# Patient Record
Sex: MALE | Race: WHITE | Employment: STUDENT | URBAN - METROPOLITAN AREA
[De-identification: names, ages, dates, MRNs, and addresses within clinical notes are randomized per-mention and may not be internally consistent; named-entity substitution may affect disease eponyms.]

---
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
## 2024-07-19 ENCOUNTER — APPOINTMENT (OUTPATIENT)
Dept: GENERAL RADIOLOGY | Facility: HOSPITAL | Age: 19
End: 2024-07-19
Attending: EMERGENCY MEDICINE
Payer: COMMERCIAL

## 2024-07-19 ENCOUNTER — HOSPITAL ENCOUNTER (EMERGENCY)
Facility: HOSPITAL | Age: 19
Discharge: HOME | End: 2024-07-19
Attending: EMERGENCY MEDICINE
Payer: COMMERCIAL

## 2024-07-19 VITALS
OXYGEN SATURATION: 98 % | HEIGHT: 71 IN | HEART RATE: 59 BPM | SYSTOLIC BLOOD PRESSURE: 106 MMHG | BODY MASS INDEX: 21 KG/M2 | DIASTOLIC BLOOD PRESSURE: 59 MMHG | RESPIRATION RATE: 16 BRPM | WEIGHT: 150 LBS

## 2024-07-19 DIAGNOSIS — S61.411A LACERATION OF RIGHT HAND WITHOUT FOREIGN BODY, INITIAL ENCOUNTER: Primary | ICD-10-CM

## 2024-07-19 DIAGNOSIS — F16.90: ICD-10-CM

## 2024-07-19 DIAGNOSIS — S43.005A DISLOCATION OF LEFT SHOULDER JOINT, INITIAL ENCOUNTER: ICD-10-CM

## 2024-07-19 LAB
ALBUMIN SERPL-MCNC: 5.1 G/DL (ref 3.2–4.8)
ALBUMIN/GLOB SERPL: 2.4 {RATIO} (ref 1–2)
ALP LIVER SERPL-CCNC: 71 U/L
ALT SERPL-CCNC: 22 U/L
ANION GAP SERPL CALC-SCNC: 18 MMOL/L (ref 0–18)
APAP SERPL-MCNC: <2 UG/ML (ref 10–20)
AST SERPL-CCNC: 37 U/L (ref ?–34)
ATRIAL RATE: 144 BPM
BASOPHILS # BLD AUTO: 0.06 X10(3) UL (ref 0–0.2)
BASOPHILS NFR BLD AUTO: 0.3 %
BILIRUB SERPL-MCNC: 0.5 MG/DL (ref 0.3–1.2)
BUN BLD-MCNC: 17 MG/DL (ref 9–23)
CALCIUM BLD-MCNC: 10.1 MG/DL (ref 8.7–10.4)
CHLORIDE SERPL-SCNC: 104 MMOL/L (ref 98–112)
CO2 SERPL-SCNC: 16 MMOL/L (ref 21–32)
CREAT BLD-MCNC: 1.47 MG/DL
EGFRCR SERPLBLD CKD-EPI 2021: 70 ML/MIN/1.73M2 (ref 60–?)
EOSINOPHIL # BLD AUTO: 0.04 X10(3) UL (ref 0–0.7)
EOSINOPHIL NFR BLD AUTO: 0.2 %
ERYTHROCYTE [DISTWIDTH] IN BLOOD BY AUTOMATED COUNT: 12.5 %
ETHANOL SERPL-MCNC: <3 MG/DL (ref ?–3)
GLOBULIN PLAS-MCNC: 2.1 G/DL (ref 2.8–4.4)
GLUCOSE BLD-MCNC: 349 MG/DL (ref 70–99)
HCT VFR BLD AUTO: 45.7 %
HGB BLD-MCNC: 15.6 G/DL
IMM GRANULOCYTES # BLD AUTO: 0.13 X10(3) UL (ref 0–1)
IMM GRANULOCYTES NFR BLD: 0.7 %
LYMPHOCYTES # BLD AUTO: 1.69 X10(3) UL (ref 1.5–5)
LYMPHOCYTES NFR BLD AUTO: 8.5 %
MCH RBC QN AUTO: 29.7 PG (ref 26–34)
MCHC RBC AUTO-ENTMCNC: 34.1 G/DL (ref 31–37)
MCV RBC AUTO: 86.9 FL
MONOCYTES # BLD AUTO: 0.97 X10(3) UL (ref 0.1–1)
MONOCYTES NFR BLD AUTO: 4.9 %
NEUTROPHILS # BLD AUTO: 17.1 X10 (3) UL (ref 1.5–7.7)
NEUTROPHILS # BLD AUTO: 17.1 X10(3) UL (ref 1.5–7.7)
NEUTROPHILS NFR BLD AUTO: 85.4 %
OSMOLALITY SERPL CALC.SUM OF ELEC: 301 MOSM/KG (ref 275–295)
P AXIS: 79 DEGREES
P-R INTERVAL: 142 MS
PLATELET # BLD AUTO: 285 10(3)UL (ref 150–450)
POTASSIUM SERPL-SCNC: 3.3 MMOL/L (ref 3.5–5.1)
PROT SERPL-MCNC: 7.2 G/DL (ref 5.7–8.2)
Q-T INTERVAL: 244 MS
QRS DURATION: 82 MS
QTC CALCULATION (BEZET): 377 MS
R AXIS: 87 DEGREES
RBC # BLD AUTO: 5.26 X10(6)UL
SALICYLATES SERPL-MCNC: <3 MG/DL (ref 2.8–20)
SARS-COV-2 RNA RESP QL NAA+PROBE: NOT DETECTED
SODIUM SERPL-SCNC: 138 MMOL/L (ref 136–145)
T AXIS: 18 DEGREES
VENTRICULAR RATE: 144 BPM
WBC # BLD AUTO: 20 X10(3) UL (ref 4–11)

## 2024-07-19 PROCEDURE — 96372 THER/PROPH/DIAG INJ SC/IM: CPT

## 2024-07-19 PROCEDURE — 23650 CLTX SHO DSLC W/MNPJ WO ANES: CPT

## 2024-07-19 PROCEDURE — 73130 X-RAY EXAM OF HAND: CPT | Performed by: EMERGENCY MEDICINE

## 2024-07-19 PROCEDURE — 85025 COMPLETE CBC W/AUTO DIFF WBC: CPT | Performed by: EMERGENCY MEDICINE

## 2024-07-19 PROCEDURE — 73020 X-RAY EXAM OF SHOULDER: CPT | Performed by: EMERGENCY MEDICINE

## 2024-07-19 PROCEDURE — 93005 ELECTROCARDIOGRAM TRACING: CPT

## 2024-07-19 PROCEDURE — 73030 X-RAY EXAM OF SHOULDER: CPT | Performed by: EMERGENCY MEDICINE

## 2024-07-19 PROCEDURE — 93010 ELECTROCARDIOGRAM REPORT: CPT

## 2024-07-19 PROCEDURE — 80053 COMPREHEN METABOLIC PANEL: CPT | Performed by: EMERGENCY MEDICINE

## 2024-07-19 PROCEDURE — 80179 DRUG ASSAY SALICYLATE: CPT | Performed by: EMERGENCY MEDICINE

## 2024-07-19 PROCEDURE — 82077 ASSAY SPEC XCP UR&BREATH IA: CPT | Performed by: EMERGENCY MEDICINE

## 2024-07-19 PROCEDURE — 80143 DRUG ASSAY ACETAMINOPHEN: CPT | Performed by: EMERGENCY MEDICINE

## 2024-07-19 PROCEDURE — 96360 HYDRATION IV INFUSION INIT: CPT

## 2024-07-19 PROCEDURE — 99285 EMERGENCY DEPT VISIT HI MDM: CPT

## 2024-07-19 RX ORDER — MIRTAZAPINE 15 MG/1
15 TABLET, FILM COATED ORAL NIGHTLY
COMMUNITY

## 2024-07-19 RX ORDER — POTASSIUM CHLORIDE 20 MEQ/1
40 TABLET, EXTENDED RELEASE ORAL ONCE
Status: COMPLETED | OUTPATIENT
Start: 2024-07-19 | End: 2024-07-19

## 2024-07-19 RX ORDER — BUPIVACAINE HYDROCHLORIDE 2.5 MG/ML
INJECTION, SOLUTION EPIDURAL; INFILTRATION; INTRACAUDAL
Status: COMPLETED
Start: 2024-07-19 | End: 2024-07-19

## 2024-07-19 RX ORDER — CYPROHEPTADINE HYDROCHLORIDE 4 MG/1
4 TABLET ORAL 3 TIMES DAILY PRN
COMMUNITY

## 2024-07-19 NOTE — ED QUICK NOTES
Call placed to patient's mother to request updated medication info as med list provided by EMS does not contain dosages. No answer, voicemail left with request for return call.

## 2024-07-19 NOTE — ED QUICK NOTES
Patient now calm and cooperative. Still with intermittent confusion and forgetfulness, requiring frequent reorientation and redirection but responds appropriately to verbal reinforcement. Patient verbalizes remorse, states he is embarrassed with his behavior and is apologetic. Verbalizes understanding of plan of care and denies needs at this time.

## 2024-07-19 NOTE — ED NOTES
Spoke with Aruna, pt's mother and re-informed her that pt's has been discharged and needs a ride home. Mother stated she will be here in 15 minutes. Pt will be walked to the lobby to wait for ride.

## 2024-07-19 NOTE — ED QUICK NOTES
Patient admits to taking LSD tonight. Lacerations present to back and right hand. Right hand wrapped with pressure dressing.
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
## 2024-07-19 NOTE — DISCHARGE INSTRUCTIONS
Return to ER if fever, discharge, redness or other problems    Keep wound Clean and dry for 48 hours    Polysporin to wound    Suture removal 14 days

## 2024-07-29 NOTE — PROGRESS NOTES
Dx: s/p ORIF fibular fx       Insurance (Authorized # of Visits):  6           Authorizing Physician: Dr. Tania Marin  Next MD visit: 11/13  Fall Risk: standard         Precautions: NWB until physician follow up           Subjective: Patient reports that he h [facility].    Continue medications as directed.    Please discuss with Dr. Renteria other possible treatments such as Chelsea valve treatment vs Ohtuvayre.    Follow up in 3 months or sooner if problems or symptoms worsen prior.

## 2024-07-31 ENCOUNTER — HOSPITAL ENCOUNTER (EMERGENCY)
Facility: HOSPITAL | Age: 19
Discharge: HOME | End: 2024-07-31
Attending: EMERGENCY MEDICINE
Payer: COMMERCIAL

## 2024-07-31 VITALS
OXYGEN SATURATION: 95 % | RESPIRATION RATE: 20 BRPM | SYSTOLIC BLOOD PRESSURE: 112 MMHG | BODY MASS INDEX: 18 KG/M2 | TEMPERATURE: 98 F | DIASTOLIC BLOOD PRESSURE: 72 MMHG | HEART RATE: 71 BPM | WEIGHT: 130 LBS

## 2024-07-31 DIAGNOSIS — Z48.02 ENCOUNTER FOR REMOVAL OF SUTURES: Primary | ICD-10-CM
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
## 2025-03-26 ENCOUNTER — APPOINTMENT (OUTPATIENT)
Dept: GENERAL RADIOLOGY | Facility: HOSPITAL | Age: 20
End: 2025-03-26
Attending: EMERGENCY MEDICINE
Payer: COMMERCIAL

## 2025-03-26 ENCOUNTER — HOSPITAL ENCOUNTER (EMERGENCY)
Facility: HOSPITAL | Age: 20
Discharge: HOME | End: 2025-03-26
Attending: EMERGENCY MEDICINE
Payer: COMMERCIAL

## 2025-03-26 VITALS
RESPIRATION RATE: 18 BRPM | HEART RATE: 91 BPM | DIASTOLIC BLOOD PRESSURE: 83 MMHG | OXYGEN SATURATION: 100 % | BODY MASS INDEX: 18 KG/M2 | WEIGHT: 130 LBS | SYSTOLIC BLOOD PRESSURE: 122 MMHG | TEMPERATURE: 99 F

## 2025-03-26 DIAGNOSIS — S43.005A DISLOCATION OF LEFT SHOULDER JOINT, INITIAL ENCOUNTER: Primary | ICD-10-CM

## 2025-03-26 PROCEDURE — 23650 CLTX SHO DSLC W/MNPJ WO ANES: CPT

## 2025-03-26 PROCEDURE — 99284 EMERGENCY DEPT VISIT MOD MDM: CPT

## 2025-03-26 PROCEDURE — 73030 X-RAY EXAM OF SHOULDER: CPT | Performed by: EMERGENCY MEDICINE

## 2025-03-27 NOTE — DISCHARGE INSTRUCTIONS
Tylenol and/or ibuprofen for discomfort.  Shoulder immobilizer for comfort.  Recommend following up with orthopedic surgery for evaluation of recurrent shoulder dislocations.  Follow-up with PMD as needed.  Return if increased concerns.

## 2025-03-27 NOTE — ED PROVIDER NOTES
Patient Seen in: [facility] Emergency Department      History     Chief Complaint   Patient presents with    Arm or Hand Injury     Stated Complaint: left shoulder poss dislocation,    Subjective:   HPI      Patient is a 19-year-old who said that he dislocated his left shoulder earlier today was pushing off the wall some benign activity.  He said he has dislocated the shoulder 3 times in the past.  No other injuries or complaints.    Objective:     Past Medical History:    Attention deficit hyperactivity disorder (ADHD)    as child/ no meds    Closed fracture of left fibula    Surgery scheduled 9/29/20    Depression    Migraines    OD (overdose of drug)    intentional of prescribed Trazodone    PONV (postoperative nausea and vomiting)    very nausea              Past Surgical History:   Procedure Laterality Date    Other      right forearm was set under twilight 5/2019    Other surgical history  09/2020    left ankle fracture                Social History     Socioeconomic History    Marital status: Single   Tobacco Use    Smoking status: Some Days     Types: Cigarettes    Smokeless tobacco: Former   Vaping Use    Vaping status: Never Used   Substance and Sexual Activity    Alcohol use: Not Currently     Comment: 10/4/2021:     Drug use: Not Currently     Types: Cannabis     Comment: 10/4/2021: one year ago with \"weed\"      Social Drivers of Health     Food Insecurity: No Food Insecurity (9/17/2021)    Received from [facility]    Hunger Vital Sign     Worried About Running Out of Food in the Last Year: Never true     Ran Out of Food in the Last Year: Never true                  Physical Exam     ED Triage Vitals [03/26/25 1941]   /85   Pulse 102   Resp 16   Temp 98.7 °F (37.1 °C)   Temp src Oral   SpO2 100 %   O2 Device None (Room air)       Current Vitals:   Vital Signs  BP: 122/83  Pulse: 91  Resp: 18  Temp: 98.7 °F (37.1 °C)  Temp src: Oral    Oxygen Therapy  SpO2: 100 %  O2 Device:  None (Room air)        Physical Exam     GENERAL: Patient is awake, alert, active and interactive.  HEENT: Conjunctiva are clear.  Pupils are equal round reactive to light.    Neck is supple with no pain to movement.  CHEST: Patient is breathing comfortably.  HEART: Regular rate and rhythm no murmur  ABDOMEN: nondistended,   EXTREMITIES: Normal capillary refill.  Patient's left shoulder appears dislocated.  Good peripheral pulses, sensation, and movement distally.  SKIN: Well perfused, without cyanosis.  No rashes.  NEUROLOGIC: No focal deficits visualized.  ED Course   Labs Reviewed - No data to display  Patient has shoulder dislocation that requires reduction.  Prior to procedure, documentation was reviewed, appropriate equipment was present and a time out was performed to identify the correct patient, procedure and site.  After discussing the risks, benefits, and alternatives and obtaining informed consent patient was placed in the prone position with the affected arm hanging off the gurney.  Medial force was placed on the tip of the scapula with gentle downward traction on the humerus.  The dislocation reduced.  Postreduction x-ray showed normal alignment of the bones.  Patient tolerated the procedure well.       XR SHOULDER, COMPLETE (MIN 2 VIEWS), LEFT (CPT=73030)    Result Date: 3/26/2025  PROCEDURE:  XR SHOULDER, COMPLETE (MIN 2 VIEWS), LEFT (CPT=73030)  TECHNIQUE:  Multiple views were obtained.  COMPARISON:  [facility] , XR, XR SHOULDER, (1 VIEW), LEFT (CPT=73020), 7/19/2024, 3:09 AM.  INDICATIONS:  s/p dislocation reduction  PATIENT STATED HISTORY: (As transcribed by Technologist)  Patient offered no additional history at this time.    FINDINGS:  No glenohumeral joint dislocation is identified on these limited two views.  No acute displaced osseous fracture is seen on these limited two views.            CONCLUSION:  See above.   LOCATION:  [facility]   Dictated by (CST): Stromberg, LeRoy, MD on 3/26/2025 at 9:07  PM     Finalized by (CST): Stromberg, LeRoy, MD on 3/26/2025 at 9:08 PM          I personally reviewed and interpreted the x-rays: Postreduction x-rays of the left shoulder show no fracture or dislocation.       MDM      Patient was screened and evaluated during this visit.   As a treating physician attending to the patient, I determined, within reasonable clinical confidence and prior to discharge, that an emergency medical condition was not or was no longer present.  There was no indication for further evaluation, treatment or admission on an emergency basis.  Comprehensive verbal and written discharge and follow-up instructions were provided to help prevent relapse or worsening.    Patient was instructed to follow-up with the primary care provider for further evaluation and treatment, but to return immediately to the ER for worsening, concerning, new, changing, or persisting symptoms.    I discussed my assessment and plan and answered all questions prior to discharge.  Patient/family expressed understanding and agreement with the plan.      Patient is alert, interactive, and in no distress upon discharge.    This report has been produced using speech recognition software and may contain errors related to that system including, but not limited to, errors in grammar, punctuation, and spelling, as well as words and phrases that possibly may have been recognized inappropriately.  If there are any questions or concerns, contact the dictating provider for clarification.          Medical Decision Making      Disposition and Plan     Clinical Impression:  1. Dislocation of left shoulder joint, initial encounter         Disposition:  Discharge  3/26/2025  9:19 pm    Follow-up:  Gregg Orozco MD  [address] [facility] [address]  [place] [address]  [phone]    Schedule an appointment as soon as possible for a visit      Aan Petersen,   [address] [place] [address]  [phone]    Follow up  As  [facility] Emergency Department  [address] [facility] [address]  [phone]  Follow up  Immediately if symptoms worsen, increased concerns          Medications Prescribed:  Discharge Medication List as of 3/26/2025  9:21 PM              Supplementary Documentation:

## (undated) DEVICE — STERILE POLYISOPRENE POWDER-FREE SURGICAL GLOVES: Brand: PROTEXIS

## (undated) DEVICE — LOWER EXTREMITY CDS-LF: Brand: MEDLINE INDUSTRIES, INC.

## (undated) DEVICE — PREMIUM WET SKIN PREP TRAY: Brand: MEDLINE INDUSTRIES, INC.

## (undated) DEVICE — SUTURE ETHILON 3-0 PS-1

## (undated) DEVICE — PADDING CAST SOFT ROLL 4\"

## (undated) DEVICE — SUTURE PDS II 0 CT-1

## (undated) DEVICE — HOOK LOCK LATEX FREE ELASTIC BANDAGE 6INX5YD

## (undated) DEVICE — SUTURE ETHILON 3-0 FSL

## (undated) DEVICE — WIRE K ZIM 1.6 4901-16-15

## (undated) DEVICE — SUTURE VICRYL 2-0 FS-1

## (undated) DEVICE — ALCOHOL 70% 4 OZ

## (undated) DEVICE — GOWN SURG AERO CHROME XXL

## (undated) DEVICE — ZIMMER® STERILE DISPOSABLE TOURNIQUET CUFF WITH PLC, DUAL PORT, SINGLE BLADDER, 24 IN. (61 CM)

## (undated) DEVICE — 3M™ STERI-DRAPE™ U-DRAPE 1015: Brand: STERI-DRAPE™

## (undated) DEVICE — DRAPE,U/SHT,SPLIT,FILM,60X84,STERILE: Brand: MEDLINE

## (undated) DEVICE — DRILL BIT ZIM 2.5 4806-110-25

## (undated) DEVICE — KENDALL SCD EXPRESS SLEEVES, KNEE LENGTH, MEDIUM: Brand: KENDALL SCD

## (undated) DEVICE — STERILE POLYISOPRENE POWDER-FREE SURGICAL GLOVES WITH EMOLLIENT COATING: Brand: PROTEXIS

## (undated) DEVICE — 3M™ IOBAN™ 2 ANTIMICROBIAL INCISE DRAPE 6648EZ: Brand: IOBAN™ 2

## (undated) DEVICE — 3M™ IOBAN™ 2 ANTIMICROBIAL INCISE DRAPE 6650EZ: Brand: IOBAN™ 2

## (undated) DEVICE — SCREW CORT 3.5X16 4835-16-01
Type: IMPLANTABLE DEVICE | Site: ANKLE | Status: NON-FUNCTIONAL
Removed: 2020-09-29

## (undated) DEVICE — GAUZE SPONGES,12 PLY: Brand: CURITY

## (undated) DEVICE — C-ARM: Brand: UNBRANDED

## (undated) DEVICE — SUTURE MONOCRYL 2-0 SH

## (undated) DEVICE — SUTURE VICRYL 0 CP-2

## (undated) DEVICE — SOL  .9 1000ML BTL

## (undated) DEVICE — C-ARMOR C-ARM EQUIPMENT COVERS CLEAR STERILE UNIVERSAL FIT 12 PER CASE: Brand: C-ARMOR

## (undated) DEVICE — NON-ADHERENT STRIPS,OIL EMULSION: Brand: CURITY

## (undated) DEVICE — DRAPE C-ARM UNIVERSAL

## (undated) DEVICE — CHLORAPREP 26ML APPLICATOR

## (undated) DEVICE — STERILE HOOK LOCK LATEX FREE ELASTIC BANDAGE 6INX5YD: Brand: HOOK LOCK™

## (undated) DEVICE — STERILE SYNTHETIC POLYISOPRENE POWDER-FREE SURGICAL GLOVES WITH HYDROGEL COATING, SMOOTH FINISH, STRAIGHT FINGER: Brand: PROTEXIS

## (undated) DEVICE — SCREW CORT 3.5X18 4835-18-01
Type: IMPLANTABLE DEVICE | Site: ANKLE | Status: NON-FUNCTIONAL
Removed: 2020-09-29

## (undated) DEVICE — SPLINT PRECUT SYNTH 5X30

## (undated) DEVICE — SCREW CORT 3.5X14 4835-14-01
Type: IMPLANTABLE DEVICE | Site: ANKLE | Status: NON-FUNCTIONAL
Removed: 2020-09-29

## (undated) DEVICE — DRILL BIT ZIM 3.5 4806-110-35

## (undated) DEVICE — #15 STERILE STAINLESS BLADE: Brand: STERILE STAINLESS BLADES

## (undated) DEVICE — SCD SLEEVE KNEE HI BLEND

## (undated) NOTE — ED AVS SNAPSHOT
[facility] Emergency Department    [facility]  [address] [place] [address]    Phone:  [phone]    Fax:  Rory Ricardo [place] [address]   MRN: [id number]    Department:  [facility] Emergency Department   Date of Visit:  2/2/2017 IF THERE IS ANY CHANGE OR WORSENING OF YOUR CONDITION, CALL YOUR PRIMARY CARE PHYSICIAN AT ONCE OR RETURN IMMEDIATELY TO THE EMERGENCY DEPARTMENT.     If you have been prescribed any medication(s), please fill your prescription right away and begin taking t

## (undated) NOTE — LETTER
09/10/21    Date:     Patient Name: Ely Garay        To Whom it may concern: This letter has been written at the patient's request. The above patient was seen at the [facility] for treatment of a medical condition.     This patient shoul

## (undated) NOTE — ED AVS SNAPSHOT
[facility] in [address] [place] [address] Drive,[address]    [address] [place] [address]    Phone:  [phone]    Fax:  [phone]           Camilla Reyes   MRN: [id number]    Department:  THE [facility] in [facility] [place]   Date of Visit:  2/2/2017 from our patient liason soon after your visit. Also, some patients receive a detailed feedback survey mailed to them a week after the visit. If you receive this, we would really appreciate it if you could take the time to complete it. Thank you!       You [facility] ([address]) [facility] [address] [facility]. ([address]) [facility] [address] ([address]) [address]  [facility] Morris ([facility] [address]

## (undated) NOTE — ED AVS SNAPSHOT
Amna Krishnaas   MRN: [id number]    Department:  [facility] Emergency Department   Date of Visit:  5/30/2019           Disclosure     Insurance plans vary and the physician(s) referred by the ER may not be covered by your plan.  Please contact your i tell this physician (or your personal doctor if your instructions are to return to your personal doctor) about any new or lasting problems. The primary care or specialist physician will see patients referred from the [facility] Emergency Department.  Elvin Veloz

## (undated) NOTE — Clinical Note
Transferred to [facility] Emergency Department for a higher level of care.

## (undated) NOTE — LETTER
02/26/21    Date:     Patient Name: Debra Gonzales        To Whom it may concern: This letter has been written at the patient's request. The above patient was seen at the [facility] for treatment of a medical condition.     The patient may re

## (undated) NOTE — LETTER
Date: 9/21/2020    Patient Name: Brielle Redd          To Whom it may concern: This letter has been written at the patient's request. The above patient was seen at the [facility] for treatment of a medical condition.     This patient may re

## (undated) NOTE — LETTER
Patient Name: Swpana Pickett  YOB: 2005          MRN number:  [id number]  Date:  10/19/2020  Referring Physician:  Yung Gomez         LOWER EXTREMITY EVALUATION:   Referring Physician: Dr. Princess Jamil  Diagnosis: s/p ORIF fibular fx L   Date of AROM: (* denotes performed with pain)  Hip WNL B Knee WNL B Foot/Ankle        DF: R 25; L 0 passively, -10 actively  PF: R 45; L 25  INV: R 35; L 20  EV: R 20; L 5  Great toe ext: R WNL; L WNL     Accessory motion: forefoot WNL, mild restriction midfoot Patient/Family/Caregiver was advised of these findings, precautions, and treatment options and has agreed to actively participate in planning and for this course of care.     Thank you for your referral. Please co-sign or sign and return this letter via fax

## (undated) NOTE — ED AVS SNAPSHOT
[facility] Emergency Department    [place] [address]    Phone:  [phone]    Fax:  [address] Ric[address] [place] [address]   MRN: [id number]    Department:  [facility] Emergency Department   Date of Visit:  2/2/2017 Expect to receive an electronic request (by e-mail or text) to complete a self-assessment the day after your visit. You may also receive a call from our patient liason soon after your visit.  Also, some patients receive a detailed feedback survey mailed to [address] [phone] [address] ([address] [facility] [id number] ([address]) [address] [place] [address]